# Patient Record
(demographics unavailable — no encounter records)

---

## 2024-10-31 NOTE — HISTORY OF PRESENT ILLNESS
[de-identified] : Mr. SAMUEL RANKELL is a 82 year old male with Hx of HTN, HLD, BPH, A fib, s/p ablation, PPM, forgetfulness, who presents for an annual wellness visit, Rx refills. He is accompanied by his nephew.   Pt states he is feeling well.  Denies any SOB, CP, abdominal pain, N/V/D, headache, dizziness, or leg swelling. Reports compliance with taking his meds daily.

## 2024-10-31 NOTE — ASSESSMENT
[FreeTextEntry1] : Annual wellness  pt no longer goes for colonoscopy  Forgetful follow up with neurology  HTN, Hx of A fib, s/p ablation, PPM cont Metoprolol 25mg daily cont Telmisartan 40mg daily cont Xarelto 20mg daily Reinforced the importance of following a low sodium diet/increased physical activity. following with cardiology - instructed to follow up  HLD cont Rosuvastatin 10mg daily Reinforced the importance of following a low cholesterol/low fat diet and increased physical activity. We'll check Lipid panel and LFTs today.  Hx of BPH We'll check PSA today.  All meds renewed.  flu vaccine administered  Blood work ordered. Follow up in one week for lab results

## 2024-10-31 NOTE — HEALTH RISK ASSESSMENT
[Excellent] : ~his/her~ current health as excellent [Fair] :  ~his/her~ mood as fair [No] : In the past 12 months have you used drugs other than those required for medical reasons? No [No falls in past year] : Patient reported no falls in the past year [Little interest or pleasure doing things] : 1) Little interest or pleasure doing things [Feeling down, depressed, or hopeless] : 2) Feeling down, depressed, or hopeless [0] : 2) Feeling down, depressed, or hopeless: Not at all (0) [PHQ-2 Negative - No further assessment needed] : PHQ-2 Negative - No further assessment needed [Never] : Never [Patient reported colonoscopy was normal] : Patient reported colonoscopy was normal [With Family] : lives with family [# of Members in Household ___] :  household currently consist of [unfilled] member(s) [Retired] : retired [High School] : high school [] :  [# Of Children ___] : has [unfilled] children [Feels Safe at Home] : Feels safe at home [Fully functional (bathing, dressing, toileting, transferring, walking, feeding)] : Fully functional (bathing, dressing, toileting, transferring, walking, feeding) [Fully functional (using the telephone, shopping, preparing meals, housekeeping, doing laundry, using] : Fully functional and needs no help or supervision to perform IADLs (using the telephone, shopping, preparing meals, housekeeping, doing laundry, using transportation, managing medications and managing finances) [Reports changes in hearing] : Reports changes in hearing [Reports normal functional visual acuity (ie: able to read med bottle)] : Reports normal functional visual acuity [Smoke Detector] : smoke detector [Carbon Monoxide Detector] : carbon monoxide detector [Seat Belt] :  uses seat belt [Sunscreen] : uses sunscreen [de-identified] : Maintains active by walking. [[id number]] : 0 [de-identified] : Maintains a healthy diet.  [NO] : No [Reports changes in vision] : Reports no changes in vision [Reports changes in dental health] : Reports no changes in dental health [Travel to Developing Areas] : does not  travel to developing areas [TB Exposure] : is not being exposed to tuberculosis [Caregiver Concerns] : does not have caregiver concerns [ColonoscopyDate] : "A while ago" [de-identified] : Lives with his wife [de-identified] : Some college [de-identified] : Decreased hearing. Went to an ENT a couple of weeks ago. [de-identified] : Follows with ophthalmologist. Wears reading glasses.  [de-identified] : Has upper and lower dentures. Hasn't seen the dentist in a few years.  [FreeTextEntry4] : Doesn't have a proxy.

## 2024-10-31 NOTE — PHYSICAL EXAM
[No Acute Distress] : no acute distress [Well Nourished] : well nourished [Well Developed] : well developed [Well-Appearing] : well-appearing [Normal Sclera/Conjunctiva] : normal sclera/conjunctiva [PERRL] : pupils equal round and reactive to light [EOMI] : extraocular movements intact [Normal Outer Ear/Nose] : the outer ears and nose were normal in appearance [Normal Oropharynx] : the oropharynx was normal [Normal TMs] : both tympanic membranes were normal [No JVD] : no jugular venous distention [No Lymphadenopathy] : no lymphadenopathy [Supple] : supple [Thyroid Normal, No Nodules] : the thyroid was normal and there were no nodules present [No Respiratory Distress] : no respiratory distress  [No Accessory Muscle Use] : no accessory muscle use [Clear to Auscultation] : lungs were clear to auscultation bilaterally [Normal Rate] : normal rate  [Regular Rhythm] : with a regular rhythm [Normal S1, S2] : normal S1 and S2 [Pedal Pulses Present] : the pedal pulses are present [No Edema] : there was no peripheral edema [No Extremity Clubbing/Cyanosis] : no extremity clubbing/cyanosis [Soft] : abdomen soft [Non Tender] : non-tender [Non-distended] : non-distended [No Masses] : no abdominal mass palpated [No HSM] : no HSM [Normal Bowel Sounds] : normal bowel sounds [Normal Posterior Cervical Nodes] : no posterior cervical lymphadenopathy [Normal Anterior Cervical Nodes] : no anterior cervical lymphadenopathy [No CVA Tenderness] : no CVA  tenderness [No Spinal Tenderness] : no spinal tenderness [No Joint Swelling] : no joint swelling [Grossly Normal Strength/Tone] : grossly normal strength/tone [No Rash] : no rash [Coordination Grossly Intact] : coordination grossly intact [No Focal Deficits] : no focal deficits [Normal Gait] : normal gait [Normal Affect] : the affect was normal [de-identified] : + murmur

## 2024-10-31 NOTE — PHYSICAL EXAM
[No Acute Distress] : no acute distress [Well Nourished] : well nourished [Well Developed] : well developed [Well-Appearing] : well-appearing [Normal Sclera/Conjunctiva] : normal sclera/conjunctiva [PERRL] : pupils equal round and reactive to light [EOMI] : extraocular movements intact [Normal Outer Ear/Nose] : the outer ears and nose were normal in appearance [Normal Oropharynx] : the oropharynx was normal [Normal TMs] : both tympanic membranes were normal [No JVD] : no jugular venous distention [No Lymphadenopathy] : no lymphadenopathy [Supple] : supple [Thyroid Normal, No Nodules] : the thyroid was normal and there were no nodules present [No Respiratory Distress] : no respiratory distress  [No Accessory Muscle Use] : no accessory muscle use [Clear to Auscultation] : lungs were clear to auscultation bilaterally [Normal Rate] : normal rate  [Regular Rhythm] : with a regular rhythm [Normal S1, S2] : normal S1 and S2 [Pedal Pulses Present] : the pedal pulses are present [No Edema] : there was no peripheral edema [No Extremity Clubbing/Cyanosis] : no extremity clubbing/cyanosis [Soft] : abdomen soft [Non Tender] : non-tender [Non-distended] : non-distended [No Masses] : no abdominal mass palpated [No HSM] : no HSM [Normal Bowel Sounds] : normal bowel sounds [Normal Posterior Cervical Nodes] : no posterior cervical lymphadenopathy [Normal Anterior Cervical Nodes] : no anterior cervical lymphadenopathy [No CVA Tenderness] : no CVA  tenderness [No Spinal Tenderness] : no spinal tenderness [No Joint Swelling] : no joint swelling [Grossly Normal Strength/Tone] : grossly normal strength/tone [No Rash] : no rash [Coordination Grossly Intact] : coordination grossly intact [No Focal Deficits] : no focal deficits [Normal Gait] : normal gait [Normal Affect] : the affect was normal [de-identified] : + murmur

## 2024-10-31 NOTE — HISTORY OF PRESENT ILLNESS
[de-identified] : Mr. SAMUEL RANKELL is a 82 year old male with Hx of HTN, HLD, BPH, A fib, s/p ablation, PPM, forgetfulness, who presents for an annual wellness visit, Rx refills. He is accompanied by his nephew.   Pt states he is feeling well.  Denies any SOB, CP, abdominal pain, N/V/D, headache, dizziness, or leg swelling. Reports compliance with taking his meds daily.

## 2024-10-31 NOTE — ADDENDUM
[FreeTextEntry1] : Documented by Chandni Cleveland acting as a scribe for Dr. Modesta Lala. 10/29/2024   All medical record entries made by the scribe were at my, Dr. Modesta Lala, direction and personally dictated by me on 10/29/2024. I have reviewed the chart and agree that the record accurately reflects my personal performance of the history, physical exam, assessment and plan. I have also personally directed, reviewed, and agreed with the chart.

## 2024-10-31 NOTE — HEALTH RISK ASSESSMENT
[Excellent] : ~his/her~ current health as excellent [Fair] :  ~his/her~ mood as fair [No] : In the past 12 months have you used drugs other than those required for medical reasons? No [No falls in past year] : Patient reported no falls in the past year [Little interest or pleasure doing things] : 1) Little interest or pleasure doing things [Feeling down, depressed, or hopeless] : 2) Feeling down, depressed, or hopeless [0] : 2) Feeling down, depressed, or hopeless: Not at all (0) [PHQ-2 Negative - No further assessment needed] : PHQ-2 Negative - No further assessment needed [Never] : Never [Patient reported colonoscopy was normal] : Patient reported colonoscopy was normal [With Family] : lives with family [# of Members in Household ___] :  household currently consist of [unfilled] member(s) [Retired] : retired [High School] : high school [] :  [# Of Children ___] : has [unfilled] children [Feels Safe at Home] : Feels safe at home [Fully functional (bathing, dressing, toileting, transferring, walking, feeding)] : Fully functional (bathing, dressing, toileting, transferring, walking, feeding) [Fully functional (using the telephone, shopping, preparing meals, housekeeping, doing laundry, using] : Fully functional and needs no help or supervision to perform IADLs (using the telephone, shopping, preparing meals, housekeeping, doing laundry, using transportation, managing medications and managing finances) [Reports changes in hearing] : Reports changes in hearing [Reports normal functional visual acuity (ie: able to read med bottle)] : Reports normal functional visual acuity [Smoke Detector] : smoke detector [Carbon Monoxide Detector] : carbon monoxide detector [Seat Belt] :  uses seat belt [Sunscreen] : uses sunscreen [de-identified] : Maintains active by walking. [de-identified] : Maintains a healthy diet.  [[id number]] : 0 [NO] : No [Reports changes in vision] : Reports no changes in vision [Reports changes in dental health] : Reports no changes in dental health [Travel to Developing Areas] : does not  travel to developing areas [TB Exposure] : is not being exposed to tuberculosis [Caregiver Concerns] : does not have caregiver concerns [ColonoscopyDate] : "A while ago" [de-identified] : Lives with his wife [de-identified] : Some college [de-identified] : Decreased hearing. Went to an ENT a couple of weeks ago. [de-identified] : Follows with ophthalmologist. Wears reading glasses.  [de-identified] : Has upper and lower dentures. Hasn't seen the dentist in a few years.  [FreeTextEntry4] : Doesn't have a proxy.

## 2025-01-07 NOTE — DISCUSSION/SUMMARY
[EKG obtained to assist in diagnosis and management of assessed problem(s)] : EKG obtained to assist in diagnosis and management of assessed problem(s) [FreeTextEntry1] : IMPRESSION: Mr. Palomo is an 83 year old male with a history of atrial fibrillation s/p ablation in the past and permanent pacemaker in the past, hyperlipidemia, hypertension, forgetfulness, aortic aneurysm, and family history of cardiac disorder who presents for follow up of atrial fibrillation.   PLAN: 1. He was in atrial fibrillation on his EKG that was performed today. He will continue on Xarelto 20 mg daily for anticoagulation. He will continue on Toprol XL 25 mg daily for suppression of his ectopy. He will continue to follow with EP for his pacemaker interrogations.   2. His blood pressure is OK, thus he will continue on Telmisartan 40 mg daily and Toprol XL. He can stay off of Amlodipine given that his blood pressure is fine today. He understands the importance of a low sodium diet.  3. He will schedule an echocardiogram done to follow up his dilated proximal ascending aorta. He had a Chest CT 5/2024 that revealed a mildly dilated aorta at 4.4 cm.  4. He will continue on Rosuvastatin 10 mg daily for his cholesterol. His most recent LDL was very good with elevated triglycerides for which he will modify his diet.  5. He will follow up with me in 3 months, or sooner if he is symptomatic. 6. He will see Pulmonary given the pulmonary nodules seen on his chest CT.

## 2025-01-07 NOTE — CARDIOLOGY SUMMARY
[de-identified] : 1/7/2025: Atrial fibrillation at 92 beats per minute with a right bundle branch block, and left anterior fascicular block.  [de-identified] : 3/1/2024: Technically difficult image quality. Endocardium not well visualized; grossly mild segmental left ventricular systolic dysfunction with hypokinesis of the inferior wall. There is paradoxical septal motion, consistent with a paced rhythm. EF is approximately 50%. Mild concentric left ventricular hypertrophy. There is a prominent septal bulge, measuring approximately 1.65 cm. Mildly enlarged right ventricular cavity size with normal right ventricular systolic function. Device lead is visualized in the right heart. The left atrium is severely dilated. The right atrium is mildly dilated. Mild mitral valve stenosis. Moderate mitral regurgitation. Mitral annular calcification with thickened and calcified mitral valve leaflets and mildly decreased opening. No echocardiographic evidence of pulmonary hypertension.  Estimated pulmonary artery systolic pressure is 29 mmHg. Mild tricuspid regurgitation. Aortic root at the sinuses of Valsalva is mildly dilated, measuring 4.00 cm. Ascending aorta diameter is moderately dilated, measuring 4.70 cm. Trileaflet aortic valve with normal systolic excursion. There is calcification of the aortic valve leaflets. Mild to moderate aortic regurgitation. Mild to moderate pulmonic regurgitation. Trace pericardial effusion.     9/20/2021: Mild-moderate mitral regurgitation. Moderate aortic regurgitation. The proximal ascending aorta is dilated, measuring approximately 4.4 cm. Mildly dilated left atrium. Mild concentric left ventricular hypertrophy. Endocardium not well visualized; grossly mild segmental left ventricular systolic dysfunction with hypokinesis of the mid inferoseptal and inferior walls. EF is approximately 45%. Mild right atrial enlargement. Right ventricular enlargement with normal right ventricular systolic function.  PASP= 33 mm Hg. No pulmonary HTN. Mild tricuspid regurgitation. [de-identified] : 5/10/2024: US Abdominal Aorta 1. No AAA 2. The maximal diameter of the abodminal aorta measures 2.4 cm.

## 2025-01-07 NOTE — HISTORY OF PRESENT ILLNESS
[FreeTextEntry1] : Patient is an 83 year old male with a history of atrial fibrillation s/p ablation in the past and permanent pacemaker placement, hyperlipidemia, hypertension, aortic aneurysm, forgetfulness, and family history of cardiac disorder who presents today for follow up of atrial fibrillation. He states that he has been feeling well and denies any chest pain, shortness of breath, palpitations, headaches or dizziness. He is taking medications for his dementia (Quetiapine 25 mg three times a day and Aricept 10 mg daily).

## 2025-01-07 NOTE — PHYSICAL EXAM
[General Appearance - Well Developed] : well developed [Normal Appearance] : normal appearance [Well Groomed] : well groomed [General Appearance - Well Nourished] : well nourished [No Deformities] : no deformities [General Appearance - In No Acute Distress] : no acute distress [Normal Conjunctiva] : the conjunctiva exhibited no abnormalities [Normal Oral Mucosa] : normal oral mucosa [No Oral Pallor] : no oral pallor [No Oral Cyanosis] : no oral cyanosis [Normal Jugular Venous A Waves Present] : normal jugular venous A waves present [Normal Jugular Venous V Waves Present] : normal jugular venous V waves present [Normal Rate] : normal [Rhythm Regular] : regular [Normal S1] : normal S1 [Normal S2] : normal S2 [II] : a grade 2 [No Pitting Edema] : no pitting edema present [Respiration, Rhythm And Depth] : normal respiratory rhythm and effort [Exaggerated Use Of Accessory Muscles For Inspiration] : no accessory muscle use [Auscultation Breath Sounds / Voice Sounds] : lungs were clear to auscultation bilaterally [Bowel Sounds] : normal bowel sounds [Abdomen Soft] : soft [Abdomen Tenderness] : non-tender [Abnormal Walk] : normal gait [Nail Clubbing] : no clubbing of the fingernails [Cyanosis, Localized] : no localized cyanosis [Petechial Hemorrhages (___cm)] : no petechial hemorrhages [Skin Color & Pigmentation] : normal skin color and pigmentation [] : no rash [No Skin Ulcers] : no skin ulcer [Affect] : the affect was normal [Mood] : the mood was normal [No Anxiety] : not feeling anxious [Irregularly Irregular] : irregularly irregular [FreeTextEntry1] : Extraocular muscles intact. Anicteric sclerae.  [Right Carotid Bruit] : no bruit heard over the right carotid [Left Carotid Bruit] : no bruit heard over the left carotid [Bruit] : no bruit heard

## 2025-01-07 NOTE — CARDIOLOGY SUMMARY
[de-identified] : 1/7/2025: Atrial fibrillation at 92 beats per minute with a right bundle branch block, and left anterior fascicular block.  [de-identified] : 3/1/2024: Technically difficult image quality. Endocardium not well visualized; grossly mild segmental left ventricular systolic dysfunction with hypokinesis of the inferior wall. There is paradoxical septal motion, consistent with a paced rhythm. EF is approximately 50%. Mild concentric left ventricular hypertrophy. There is a prominent septal bulge, measuring approximately 1.65 cm. Mildly enlarged right ventricular cavity size with normal right ventricular systolic function. Device lead is visualized in the right heart. The left atrium is severely dilated. The right atrium is mildly dilated. Mild mitral valve stenosis. Moderate mitral regurgitation. Mitral annular calcification with thickened and calcified mitral valve leaflets and mildly decreased opening. No echocardiographic evidence of pulmonary hypertension.  Estimated pulmonary artery systolic pressure is 29 mmHg. Mild tricuspid regurgitation. Aortic root at the sinuses of Valsalva is mildly dilated, measuring 4.00 cm. Ascending aorta diameter is moderately dilated, measuring 4.70 cm. Trileaflet aortic valve with normal systolic excursion. There is calcification of the aortic valve leaflets. Mild to moderate aortic regurgitation. Mild to moderate pulmonic regurgitation. Trace pericardial effusion.     9/20/2021: Mild-moderate mitral regurgitation. Moderate aortic regurgitation. The proximal ascending aorta is dilated, measuring approximately 4.4 cm. Mildly dilated left atrium. Mild concentric left ventricular hypertrophy. Endocardium not well visualized; grossly mild segmental left ventricular systolic dysfunction with hypokinesis of the mid inferoseptal and inferior walls. EF is approximately 45%. Mild right atrial enlargement. Right ventricular enlargement with normal right ventricular systolic function.  PASP= 33 mm Hg. No pulmonary HTN. Mild tricuspid regurgitation. [de-identified] : 5/10/2024: US Abdominal Aorta 1. No AAA 2. The maximal diameter of the abodminal aorta measures 2.4 cm.

## 2025-01-08 NOTE — HISTORY OF PRESENT ILLNESS
[TextBox_4] : 84 yo M with PMH of Alzheimer's disease, BPH, COVID, HTN, HLD, Hypothyroidism, NSVT, Pulmonary nodules

## 2025-01-08 NOTE — ASSESSMENT
[FreeTextEntry1] : 84 yo M with PMH of Alzheimer's disease, BPH, COVID, HTN, HLD, Hypothyroidism, NSVT, AFib, Pulmonary nodules. Presents to establish care. Was referred by Cardiology give CT findings.  Has hx of AFib, on Xarelto. Denies snoring, day time somnolence. Denies any sob, cough, bernard.  CT Chest  - multiple bilateral indeterminate pulmonary nodules up to 4mm - May 2024   On exam, lungs are clear, no wheezing or rhonchi, in NAD.  Care plans discussed - Continue Xarelto 20mg daily for Afib, will need repeat CT Chest in 1 year to follow pulmonary nodules. Follow-up in appt 1 year.

## 2025-05-17 NOTE — HISTORY OF PRESENT ILLNESS
[de-identified] : 83 year old man with a history of hypertension, hyperlipidemia and atrial fibrillation.  He is s/p remote ablation (Dr. Morel about 2012) as well as permanent pacemaker (for tachy-ja syndrome). [facility] in 2018. He is maintained on Xarelto with no bleeding or TE complication.   Overall he feels well.  He denies palpitations (since the PPM). No lightheadedness/dizziness.  He does walk for exercise.    His only complaint is memory loss.   His grandson explains that he has been having nosebleeds (2x)

## 2025-05-17 NOTE — PROCEDURE
[Pacemaker] : pacemaker [Longevity: ___ months] : The estimated remaining battery life is [unfilled] months [Threshold Testing Performed] : Threshold testing was performed [Lead Imp:  ___ohms] : lead impedance was [unfilled] ohms [Sensing Amplitude ___mv] : sensing amplitude was [unfilled] mv [___V @] : [unfilled] V [___ ms] : [unfilled] ms [de-identified] : [facility] [de-identified] : [employer] [de-identified] : [id number] [de-identified] : 9/17/2018 [de-identified] : 100% AF since 9/182018

## 2025-05-17 NOTE — HISTORY OF PRESENT ILLNESS
[de-identified] : 83 year old man with a history of hypertension, hyperlipidemia and atrial fibrillation.  He is s/p remote ablation (Dr. Morel about 2012) as well as permanent pacemaker (for tachy-ja syndrome). [facility] in 2018. He is maintained on Xarelto with no bleeding or TE complication.   Overall he feels well.  He denies palpitations (since the PPM). No lightheadedness/dizziness.  He does walk for exercise.    His only complaint is memory loss.   His grandson explains that he has been having nosebleeds (2x)

## 2025-05-17 NOTE — PHYSICAL EXAM
[General Appearance - Well Developed] : well developed [Normal Appearance] : normal appearance [Well Groomed] : well groomed [General Appearance - Well Nourished] : well nourished [No Deformities] : no deformities [General Appearance - In No Acute Distress] : no acute distress [Heart Rate And Rhythm] : heart rate and rhythm were normal [Systolic grade ___/6] : A grade [unfilled]/6 systolic murmur was heard. [Respiration, Rhythm And Depth] : normal respiratory rhythm and effort [Exaggerated Use Of Accessory Muscles For Inspiration] : no accessory muscle use [Auscultation Breath Sounds / Voice Sounds] : lungs were clear to auscultation bilaterally [Left Infraclavicular] : left infraclavicular area [Clean] : clean [Dry] : dry [Well-Healed] : well-healed [Abdomen Soft] : soft [Abdomen Tenderness] : non-tender [Abdomen Mass (___ Cm)] : no abdominal mass palpated [Nail Clubbing] : no clubbing of the fingernails [Cyanosis, Localized] : no localized cyanosis [Petechial Hemorrhages (___cm)] : no petechial hemorrhages [] : no ischemic changes [FreeTextEntry1] : paradoxical S2

## 2025-05-17 NOTE — PROCEDURE
[Pacemaker] : pacemaker [Longevity: ___ months] : The estimated remaining battery life is [unfilled] months [Threshold Testing Performed] : Threshold testing was performed [Lead Imp:  ___ohms] : lead impedance was [unfilled] ohms [Sensing Amplitude ___mv] : sensing amplitude was [unfilled] mv [___V @] : [unfilled] V [___ ms] : [unfilled] ms [de-identified] : [facility] [de-identified] : [employer] [de-identified] : [id number] [de-identified] : 9/17/2018 [de-identified] : 100% AF since 9/182018

## 2025-05-17 NOTE — PROCEDURE
[Pacemaker] : pacemaker [Longevity: ___ months] : The estimated remaining battery life is [unfilled] months [Threshold Testing Performed] : Threshold testing was performed [Lead Imp:  ___ohms] : lead impedance was [unfilled] ohms [Sensing Amplitude ___mv] : sensing amplitude was [unfilled] mv [___V @] : [unfilled] V [___ ms] : [unfilled] ms [de-identified] : [facility] [de-identified] : [employer] [de-identified] : [id number] [de-identified] : 9/17/2018 [de-identified] : 100% AF since 9/182018

## 2025-05-17 NOTE — DISCUSSION/SUMMARY
[FreeTextEntry1] : 83 year old man with atrial fibrillation in the setting of hypertension.  He continues to have atrial fibrillation despite remote ablation, but tolerates it quite well.   Normal device function with adequate safety margins.  He is in chronic persistent atrial fibrillation (100% AF since 9/182018) but with good rate control and histograms.    More recently he has complained of epistaxis.  We briefly discussed JUAN JOSÉ occlusion.  I suggested an ENT evaluation. We will revisit after.

## 2025-05-17 NOTE — HISTORY OF PRESENT ILLNESS
[de-identified] : 83 year old man with a history of hypertension, hyperlipidemia and atrial fibrillation.  He is s/p remote ablation (Dr. Morel about 2012) as well as permanent pacemaker (for tachy-ja syndrome). [facility] in 2018. He is maintained on Xarelto with no bleeding or TE complication.   Overall he feels well.  He denies palpitations (since the PPM). No lightheadedness/dizziness.  He does walk for exercise.    His only complaint is memory loss.   His grandson explains that he has been having nosebleeds (2x)

## 2025-06-09 NOTE — REVIEW OF SYSTEMS
[Cough] : cough [Negative] : Heme/Lymph [Fever] : no fever [Sore Throat] : no sore throat [de-identified] : Hx of forgetfulness

## 2025-06-09 NOTE — PHYSICAL EXAM
[No Acute Distress] : no acute distress [Well Nourished] : well nourished [Well Developed] : well developed [Well-Appearing] : well-appearing [Normal Sclera/Conjunctiva] : normal sclera/conjunctiva [Normal Outer Ear/Nose] : the outer ears and nose were normal in appearance [No JVD] : no jugular venous distention [Normal TMs] : both tympanic membranes were normal [No Lymphadenopathy] : no lymphadenopathy [Supple] : supple [No Accessory Muscle Use] : no accessory muscle use [No Respiratory Distress] : no respiratory distress  [Normal Rate] : normal rate  [Clear to Auscultation] : lungs were clear to auscultation bilaterally [Normal S1, S2] : normal S1 and S2 [Pedal Pulses Present] : the pedal pulses are present [No Edema] : there was no peripheral edema [No Extremity Clubbing/Cyanosis] : no extremity clubbing/cyanosis [Soft] : abdomen soft [Non Tender] : non-tender [Non-distended] : non-distended [Normal Posterior Cervical Nodes] : no posterior cervical lymphadenopathy [No CVA Tenderness] : no CVA  tenderness [Normal Anterior Cervical Nodes] : no anterior cervical lymphadenopathy [No Spinal Tenderness] : no spinal tenderness [Grossly Normal Strength/Tone] : grossly normal strength/tone [No Joint Swelling] : no joint swelling [No Rash] : no rash [Coordination Grossly Intact] : coordination grossly intact [No Focal Deficits] : no focal deficits [Normal Gait] : normal gait [Normal Affect] : the affect was normal [de-identified] : + murmur, +heart rate irregularly irregular

## 2025-06-09 NOTE — ASSESSMENT
[FreeTextEntry1] : acute  sinusitis start augmentin 875-125mg BID with food start benzonatate 100mg BID/TID  HTN, Hx of A fib, s/p ablation, PPM, low BP today - 88/58 cont Metoprolol 25mg daily instructed to decrease Telmisartan to 20mg daily cont Xarelto 20mg daily pt instructed to increase daily water intake and add some salt to diet Reinforced the importance of following a low sodium diet/increased physical activity. following with cardiology  HLD cont Rosuvastatin 10mg daily Reinforced the importance of following a low cholesterol/low fat diet and increased physical activity.  to follow up in 1 week for bloodwork to bring a list of medications

## 2025-06-09 NOTE — HISTORY OF PRESENT ILLNESS
[de-identified] : Mr. SAMUEL RANKELL is a 83 year old male with hx of HTN, HLD, BPH, A fib, s/p ablation, PPM, forgetfulness, presenting for an acute visit. Pt accompanied by family.   Pt c/o congestion and cough for the past 3-4 days. Denies phlegm, sore throat, fever, difficulty breathing.  Denies any SOB, CP, abdominal pain, N/V/D, headache, dizziness, or leg swelling.   Reports compliance with taking his meds daily.

## 2025-06-09 NOTE — ADDENDUM
[FreeTextEntry1] : Documented by Charles Pak acting as a scribe for Dr. Modesta Lala. 06/09/2025  All medical records entries made by the scribe were at my, Dr. Lala, direction and personally dictated by me on 06/09/2025. I have reviewed the chart and agree that the record accurately reflects my personal performance of the history, physical exam, assessment and plan. I have also personally directed, reviewed, and agreed with the chart.

## 2025-07-22 NOTE — PHYSICAL EXAM
[No Acute Distress] : no acute distress [Well Nourished] : well nourished [Well Developed] : well developed [Well-Appearing] : well-appearing [Normal Sclera/Conjunctiva] : normal sclera/conjunctiva [Normal Outer Ear/Nose] : the outer ears and nose were normal in appearance [Normal TMs] : both tympanic membranes were normal [No JVD] : no jugular venous distention [No Lymphadenopathy] : no lymphadenopathy [Supple] : supple [No Respiratory Distress] : no respiratory distress  [No Accessory Muscle Use] : no accessory muscle use [Clear to Auscultation] : lungs were clear to auscultation bilaterally [Normal Rate] : normal rate  [Normal S1, S2] : normal S1 and S2 [Pedal Pulses Present] : the pedal pulses are present [No Extremity Clubbing/Cyanosis] : no extremity clubbing/cyanosis [Soft] : abdomen soft [Non Tender] : non-tender [Non-distended] : non-distended [Normal Posterior Cervical Nodes] : no posterior cervical lymphadenopathy [Normal Anterior Cervical Nodes] : no anterior cervical lymphadenopathy [No CVA Tenderness] : no CVA  tenderness [No Spinal Tenderness] : no spinal tenderness [No Joint Swelling] : no joint swelling [Grossly Normal Strength/Tone] : grossly normal strength/tone [No Rash] : no rash [Coordination Grossly Intact] : coordination grossly intact [No Focal Deficits] : no focal deficits [Normal Gait] : normal gait [Normal Affect] : the affect was normal [de-identified] : + murmur, + irregularly irregular [de-identified] : +b/l LE mild swelling

## 2025-07-22 NOTE — REVIEW OF SYSTEMS
[Lower Ext Edema] : lower extremity edema [Incontinence] : incontinence [Negative] : Heme/Lymph [de-identified] : Hx of forgetfulness

## 2025-07-22 NOTE — HISTORY OF PRESENT ILLNESS
[de-identified] : Mr. SAMUEL RANKELL is a 83 year old male with hx of HTN, HLD, BPH, A fib, s/p ablation, PPM, forgetfulness, presenting for a follow up visit. Rx refills. Pt is accompanied by his wife.   As per pt's wife pt has been experiencing swelling in his b/l ankles and rashes in legs. Also c/o urinary incontinence for the past month. Denies any SOB, CP, abdominal pain, N/V/D, headache, dizziness  Reports compliance with taking his meds daily.

## 2025-07-22 NOTE — PHYSICAL EXAM
[No Acute Distress] : no acute distress [Well Nourished] : well nourished [Well Developed] : well developed [Well-Appearing] : well-appearing [Normal Sclera/Conjunctiva] : normal sclera/conjunctiva [Normal Outer Ear/Nose] : the outer ears and nose were normal in appearance [Normal TMs] : both tympanic membranes were normal [No JVD] : no jugular venous distention [No Lymphadenopathy] : no lymphadenopathy [Supple] : supple [No Respiratory Distress] : no respiratory distress  [No Accessory Muscle Use] : no accessory muscle use [Clear to Auscultation] : lungs were clear to auscultation bilaterally [Normal Rate] : normal rate  [Normal S1, S2] : normal S1 and S2 [Pedal Pulses Present] : the pedal pulses are present [No Extremity Clubbing/Cyanosis] : no extremity clubbing/cyanosis [Soft] : abdomen soft [Non Tender] : non-tender [Non-distended] : non-distended [Normal Posterior Cervical Nodes] : no posterior cervical lymphadenopathy [Normal Anterior Cervical Nodes] : no anterior cervical lymphadenopathy [No CVA Tenderness] : no CVA  tenderness [No Spinal Tenderness] : no spinal tenderness [No Joint Swelling] : no joint swelling [Grossly Normal Strength/Tone] : grossly normal strength/tone [No Rash] : no rash [Coordination Grossly Intact] : coordination grossly intact [No Focal Deficits] : no focal deficits [Normal Gait] : normal gait [Normal Affect] : the affect was normal [de-identified] : + murmur, + irregularly irregular [de-identified] : +b/l LE mild swelling

## 2025-07-22 NOTE — PHYSICAL EXAM
[No Acute Distress] : no acute distress [Well Nourished] : well nourished [Well Developed] : well developed [Well-Appearing] : well-appearing [Normal Sclera/Conjunctiva] : normal sclera/conjunctiva [Normal Outer Ear/Nose] : the outer ears and nose were normal in appearance [Normal TMs] : both tympanic membranes were normal [No JVD] : no jugular venous distention [No Lymphadenopathy] : no lymphadenopathy [Supple] : supple [No Respiratory Distress] : no respiratory distress  [No Accessory Muscle Use] : no accessory muscle use [Clear to Auscultation] : lungs were clear to auscultation bilaterally [Normal Rate] : normal rate  [Normal S1, S2] : normal S1 and S2 [Pedal Pulses Present] : the pedal pulses are present [No Extremity Clubbing/Cyanosis] : no extremity clubbing/cyanosis [Soft] : abdomen soft [Non Tender] : non-tender [Non-distended] : non-distended [Normal Posterior Cervical Nodes] : no posterior cervical lymphadenopathy [Normal Anterior Cervical Nodes] : no anterior cervical lymphadenopathy [No CVA Tenderness] : no CVA  tenderness [No Spinal Tenderness] : no spinal tenderness [No Joint Swelling] : no joint swelling [Grossly Normal Strength/Tone] : grossly normal strength/tone [No Rash] : no rash [Coordination Grossly Intact] : coordination grossly intact [No Focal Deficits] : no focal deficits [Normal Gait] : normal gait [Normal Affect] : the affect was normal [de-identified] : + murmur, + irregularly irregular [de-identified] : +b/l LE mild swelling

## 2025-07-22 NOTE — REVIEW OF SYSTEMS
[Lower Ext Edema] : lower extremity edema [Incontinence] : incontinence [Negative] : Heme/Lymph [de-identified] : Hx of forgetfulness

## 2025-07-22 NOTE — HISTORY OF PRESENT ILLNESS
[de-identified] : Mr. SAMUEL RANKELL is a 83 year old male with hx of HTN, HLD, BPH, A fib, s/p ablation, PPM, forgetfulness, presenting for a follow up visit. Rx refills. Pt is accompanied by his wife.   As per pt's wife pt has been experiencing swelling in his b/l ankles and rashes in legs. Also c/o urinary incontinence for the past month. Denies any SOB, CP, abdominal pain, N/V/D, headache, dizziness  Reports compliance with taking his meds daily.

## 2025-07-22 NOTE — ASSESSMENT
[FreeTextEntry1] : follow up  urinary incontinence we'll check UA and urine culture today to f/u with urology  leg swelling referred for US duplex venous LE we'll check BNP to f/u with cardiology  HTN, Hx of A fib, s/p ablation, PPM, cont Metoprolol 25mg daily cont Telmisartan to 20mg daily - renewed today cont Xarelto 20mg daily Reinforced the importance of following a low sodium diet following with cardiology - pt instructed to follow up  Hx Alzheimer's cont risperidone 0.5mg in the morning, 0.25mg at night follows with psychiatrist and therapist  HLD cont Rosuvastatin 10mg daily - renewed today Reinforced the importance of following a low fat/low cholesterol diet we'll check Lipid panel and LFT's today  Hypothyroidism cont Levothyroxine 25mcg daily we'll check TSH/T4 today   bloodwork ordered follow up in one week for lab results

## 2025-07-22 NOTE — REVIEW OF SYSTEMS
[Lower Ext Edema] : lower extremity edema [Incontinence] : incontinence [Negative] : Heme/Lymph [de-identified] : Hx of forgetfulness

## 2025-07-22 NOTE — ADDENDUM
[FreeTextEntry1] : Documented by Charles Pak acting as a scribe for Dr. Modesta Lala. 07/14/2025  All medical records entries made by the scribe were at my, Dr. Lala, direction and personally dictated by me on 07/14/2025. I have reviewed the chart and agree that the record accurately reflects my personal performance of the history, physical exam, assessment and plan. I have also personally directed, reviewed, and agreed with the chart.